# Patient Record
Sex: MALE | Race: WHITE | NOT HISPANIC OR LATINO | URBAN - METROPOLITAN AREA
[De-identification: names, ages, dates, MRNs, and addresses within clinical notes are randomized per-mention and may not be internally consistent; named-entity substitution may affect disease eponyms.]

---

## 2020-07-09 ENCOUNTER — HOSPITAL ENCOUNTER (OUTPATIENT)
Dept: OTHER | Facility: HOSPITAL | Age: 45
Discharge: HOME | End: 2020-07-09
Attending: EMERGENCY MEDICINE

## 2022-06-01 ENCOUNTER — OFFICE VISIT (OUTPATIENT)
Dept: FAMILY MEDICINE CLINIC | Facility: CLINIC | Age: 47
End: 2022-06-01

## 2022-06-01 VITALS
WEIGHT: 186.8 LBS | HEIGHT: 70 IN | BODY MASS INDEX: 26.74 KG/M2 | OXYGEN SATURATION: 97 % | SYSTOLIC BLOOD PRESSURE: 138 MMHG | DIASTOLIC BLOOD PRESSURE: 92 MMHG | HEART RATE: 78 BPM | RESPIRATION RATE: 20 BRPM | TEMPERATURE: 98 F

## 2022-06-01 DIAGNOSIS — Z13.29 SCREENING FOR THYROID DISORDER: Primary | ICD-10-CM

## 2022-06-01 DIAGNOSIS — L98.9 LESION OF SKIN OF SCALP: ICD-10-CM

## 2022-06-01 DIAGNOSIS — R03.0 ELEVATED BLOOD PRESSURE READING: ICD-10-CM

## 2022-06-01 DIAGNOSIS — Z13.220 LIPID SCREENING: ICD-10-CM

## 2022-06-01 DIAGNOSIS — Z12.11 COLON CANCER SCREENING: ICD-10-CM

## 2022-06-01 DIAGNOSIS — Z01.89 ENCOUNTER FOR ROUTINE LABORATORY TESTING: ICD-10-CM

## 2022-06-01 LAB
ALBUMIN SERPL-MCNC: 4.7 G/DL (ref 3.5–5.2)
ALBUMIN/GLOB SERPL: 1.8 G/DL
ALP SERPL-CCNC: 89 U/L (ref 39–117)
ALT SERPL W P-5'-P-CCNC: 36 U/L (ref 1–41)
ANION GAP SERPL CALCULATED.3IONS-SCNC: 10.9 MMOL/L (ref 5–15)
AST SERPL-CCNC: 22 U/L (ref 1–40)
BILIRUB SERPL-MCNC: 0.4 MG/DL (ref 0–1.2)
BUN SERPL-MCNC: 16 MG/DL (ref 6–20)
BUN/CREAT SERPL: 15.4 (ref 7–25)
CALCIUM SPEC-SCNC: 9.6 MG/DL (ref 8.6–10.5)
CHLORIDE SERPL-SCNC: 101 MMOL/L (ref 98–107)
CHOLEST SERPL-MCNC: 226 MG/DL (ref 0–200)
CO2 SERPL-SCNC: 25.1 MMOL/L (ref 22–29)
CREAT SERPL-MCNC: 1.04 MG/DL (ref 0.76–1.27)
DEPRECATED RDW RBC AUTO: 39.4 FL (ref 37–54)
EGFRCR SERPLBLD CKD-EPI 2021: 88 ML/MIN/1.73
ERYTHROCYTE [DISTWIDTH] IN BLOOD BY AUTOMATED COUNT: 12.1 % (ref 12.3–15.4)
GLOBULIN UR ELPH-MCNC: 2.6 GM/DL
GLUCOSE SERPL-MCNC: 91 MG/DL (ref 65–99)
HCT VFR BLD AUTO: 44.4 % (ref 37.5–51)
HDLC SERPL-MCNC: 41 MG/DL (ref 40–60)
HGB BLD-MCNC: 15 G/DL (ref 13–17.7)
LDLC SERPL CALC-MCNC: 104 MG/DL (ref 0–100)
LDLC/HDLC SERPL: 2.18 {RATIO}
MCH RBC QN AUTO: 30.6 PG (ref 26.6–33)
MCHC RBC AUTO-ENTMCNC: 33.8 G/DL (ref 31.5–35.7)
MCV RBC AUTO: 90.6 FL (ref 79–97)
PLATELET # BLD AUTO: 373 10*3/MM3 (ref 140–450)
PMV BLD AUTO: 9.1 FL (ref 6–12)
POTASSIUM SERPL-SCNC: 4.3 MMOL/L (ref 3.5–5.2)
PROT SERPL-MCNC: 7.3 G/DL (ref 6–8.5)
RBC # BLD AUTO: 4.9 10*6/MM3 (ref 4.14–5.8)
SODIUM SERPL-SCNC: 137 MMOL/L (ref 136–145)
TRIGL SERPL-MCNC: 479 MG/DL (ref 0–150)
TSH SERPL DL<=0.05 MIU/L-ACNC: 3.67 UIU/ML (ref 0.27–4.2)
VLDLC SERPL-MCNC: 81 MG/DL (ref 5–40)
WBC NRBC COR # BLD: 7.73 10*3/MM3 (ref 3.4–10.8)

## 2022-06-01 PROCEDURE — 84443 ASSAY THYROID STIM HORMONE: CPT | Performed by: NURSE PRACTITIONER

## 2022-06-01 PROCEDURE — 36415 COLL VENOUS BLD VENIPUNCTURE: CPT | Performed by: NURSE PRACTITIONER

## 2022-06-01 PROCEDURE — 99203 OFFICE O/P NEW LOW 30 MIN: CPT | Performed by: NURSE PRACTITIONER

## 2022-06-01 PROCEDURE — 85027 COMPLETE CBC AUTOMATED: CPT | Performed by: NURSE PRACTITIONER

## 2022-06-01 PROCEDURE — 80061 LIPID PANEL: CPT | Performed by: NURSE PRACTITIONER

## 2022-06-01 PROCEDURE — 80053 COMPREHEN METABOLIC PANEL: CPT | Performed by: NURSE PRACTITIONER

## 2022-06-01 NOTE — PROGRESS NOTES
Chief Complaint  Establish Care (Pt denies any health problems) annual physical    Subjective          Zenon Camejo is a 47 y.o. male who presents to [facility]    History of Present Illness    Annual physical    Has lesion on scalp. Ongoing for 1 year. Wife advised getting larger. Denies any scabbing.     PHQ-2 Total Score: 0   PHQ-9 Total Score: 0        Review of Systems   Constitutional: Negative for chills, fatigue and fever.   Respiratory: Negative for cough and shortness of breath.    Cardiovascular: Negative for chest pain and palpitations.   Gastrointestinal: Negative for constipation, diarrhea, nausea and vomiting.   Musculoskeletal: Negative for back pain and neck pain.   Skin: Negative for rash.        Scalp lesion x2   Neurological: Negative for dizziness and headaches.          Medical History: has no past medical history on file.     Surgical History: has no past surgical history on file.     Family History: family history is not on file.     Social History: reports that he has never smoked. He has never used smokeless tobacco. He reports current alcohol use of about 2.0 standard drinks of alcohol per week.    Allergies: Patient has no known allergies.      Health Maintenance Due   Topic Date Due   • COLORECTAL CANCER SCREENING  Never done   • ANNUAL PHYSICAL  Never done   • TDAP/TD VACCINES (1 - Tdap) Never done   • COVID-19 Vaccine (2 - Booster for Royal series) 07/22/2021          No current outpatient medications on file.      Immunization History   Administered Date(s) Administered   • COVID-19 (ROYAL) 05/27/2021         Objective       Vitals:    06/01/22 1140 06/01/22 1148   BP: (!) 142/102  Comment: Pt stated he drank a [employer] energy drink this morning 138/92   BP Location: Left arm Left arm   Patient Position: Sitting Sitting   Cuff Size: Large Adult Adult   Pulse: 78    Resp: 20    Temp: 98 °F (36.7 °C)    TempSrc: Temporal    SpO2: 97%    Weight:  "84.7 kg (186 lb 12.8 oz)    Height: 177.2 cm (69.75\")       Body mass index is 27 kg/m².   Wt Readings from Last 3 Encounters:   06/01/22 84.7 kg (186 lb 12.8 oz)      BP Readings from Last 3 Encounters:   06/01/22 138/92        Physical Exam  Vitals reviewed.   Constitutional:       Appearance: Normal appearance. He is well-developed.   HENT:      Head: Normocephalic and atraumatic.   Eyes:      Conjunctiva/sclera: Conjunctivae normal.      Pupils: Pupils are equal, round, and reactive to light.   Cardiovascular:      Rate and Rhythm: Normal rate and regular rhythm.      Heart sounds: Normal heart sounds. No murmur heard.  Pulmonary:      Effort: Pulmonary effort is normal.      Breath sounds: Normal breath sounds. No wheezing or rhonchi.   Abdominal:      General: Bowel sounds are normal. There is no distension.      Palpations: Abdomen is soft.      Tenderness: There is no abdominal tenderness.   Skin:     General: Skin is warm and dry.      Comments: Patient has brown macule on left frontal region approximately 4 to 5 mm with irregular borders.    Patient has 2 scalp lesions in the parietal region that have yellow plaque covering them.   Neurological:      Mental Status: He is alert and oriented to person, place, and time.   Psychiatric:         Mood and Affect: Mood and affect normal.         Behavior: Behavior normal.         Thought Content: Thought content normal.         Judgment: Judgment normal.             Result Review :                Assessment and Plan        Diagnoses and all orders for this visit:    1. Screening for thyroid disorder (Primary)  -     TSH    2. Lipid screening  -     Lipid Panel  -     Comprehensive Metabolic Panel    3. Lesion of skin of scalp  -     Ambulatory Referral to Dermatology    4. Encounter for routine laboratory testing  -     CBC (No Diff)    5. Colon cancer screening  -     Cologuard - Stool, Per Rectum; Future    6. Elevated blood pressure reading  Comments:  monitor " blood pressure at home. denies any family hx.           Follow Up     Return for Pending results.     Updated annual wellness visit checklist.  Immunizations discussed.  Screenings discussed.  Recommend yearly dental and eye exams. Also discussed monitoring of blood pressure and lipids.         Patient was given instructions and counseling regarding his condition or for health maintenance advice. Please see specific information pulled into the AVS if appropriate.     AYAN Aburto

## 2022-06-03 DIAGNOSIS — Z13.220 LIPID SCREENING: Primary | ICD-10-CM

## 2022-06-07 ENCOUNTER — PATIENT ROUNDING (BHMG ONLY) (OUTPATIENT)
Dept: FAMILY MEDICINE CLINIC | Facility: CLINIC | Age: 47
End: 2022-06-07

## 2022-06-07 NOTE — PROGRESS NOTES
A [employer] MESSAGE HAS BEEN SENT TO THE PATIENT FOR [place] [facility]

## 2022-06-09 ENCOUNTER — TELEPHONE (OUTPATIENT)
Dept: FAMILY MEDICINE CLINIC | Facility: CLINIC | Age: 47
End: 2022-06-09

## 2022-06-09 DIAGNOSIS — Z12.11 COLON CANCER SCREENING: ICD-10-CM

## 2022-06-09 NOTE — TELEPHONE ENCOUNTER
----- Message from AYAN Aburto sent at 6/9/2022  1:12 PM EDT -----  Negative Cologuard. Repeat in 3 years.

## 2024-01-05 ENCOUNTER — OFFICE VISIT (OUTPATIENT)
Dept: FAMILY MEDICINE CLINIC | Facility: CLINIC | Age: 49
End: 2024-01-05
Payer: OTHER GOVERNMENT

## 2024-01-05 VITALS
BODY MASS INDEX: 28.2 KG/M2 | HEART RATE: 88 BPM | DIASTOLIC BLOOD PRESSURE: 96 MMHG | TEMPERATURE: 98.6 F | WEIGHT: 197 LBS | SYSTOLIC BLOOD PRESSURE: 149 MMHG | OXYGEN SATURATION: 98 % | HEIGHT: 70 IN

## 2024-01-05 DIAGNOSIS — J06.9 VIRAL URI WITH COUGH: Primary | ICD-10-CM

## 2024-01-05 LAB
EXPIRATION DATE: NORMAL
INTERNAL CONTROL: NORMAL
Lab: 8172
S PYO AG THROAT QL: NEGATIVE

## 2024-01-05 PROCEDURE — 99213 OFFICE O/P EST LOW 20 MIN: CPT | Performed by: NURSE PRACTITIONER

## 2024-01-05 PROCEDURE — 87880 STREP A ASSAY W/OPTIC: CPT | Performed by: NURSE PRACTITIONER

## 2024-01-05 RX ORDER — METHYLPREDNISOLONE 4 MG/1
TABLET ORAL
Qty: 21 TABLET | Refills: 0 | Status: SHIPPED | OUTPATIENT
Start: 2024-01-05

## 2024-01-05 NOTE — PROGRESS NOTES
"Chief Complaint  Sore Throat, Cough, and Earache    Subjective      Zenon Camejo is a 48 year old male that presents to [facility] with c/o sore throat, cough and bilateral ear pain. Symptoms started about 4 days ago. He denies fever or chills. He has been using throat spray, lozenges, dayquil, nyquil, mucinex, vitamin c and zinc.   Wife is experiencing similar symptoms.              History of Present Illness    Current Outpatient Medications   Medication Instructions    methylPREDNISolone (MEDROL) 4 MG dose pack Take as directed on package instructions.       The following portions of the patient's history were reviewed and updated as appropriate: allergies, current medications, past family history, past medical history, past social history, past surgical history, and problem list.    Objective   Vital Signs:   /96   Pulse 88   Temp 98.6 °F (37 °C)   Ht 177.2 cm (69.75\")   Wt 89.4 kg (197 lb)   SpO2 98%   BMI 28.47 kg/m²     Wt Readings from Last 3 Encounters:   01/05/24 89.4 kg (197 lb)   06/01/22 84.7 kg (186 lb 12.8 oz)     BP Readings from Last 3 Encounters:   01/05/24 149/96   06/01/22 138/92     Physical Exam  Vitals reviewed.   Constitutional:       Appearance: Normal appearance. He is well-developed. He is not ill-appearing.   HENT:      Head: Normocephalic and atraumatic.      Right Ear: Tympanic membrane, ear canal and external ear normal.      Left Ear: Tympanic membrane, ear canal and external ear normal.      Nose: Nose normal.      Mouth/Throat:      Mouth: Mucous membranes are moist.      Pharynx: Posterior oropharyngeal erythema present. No oropharyngeal exudate.   Eyes:      Conjunctiva/sclera: Conjunctivae normal.      Pupils: Pupils are equal, round, and reactive to light.   Cardiovascular:      Rate and Rhythm: Normal rate and regular rhythm.      Heart sounds:      No friction rub.   Pulmonary:      Effort: Pulmonary effort is normal.      " Breath sounds: Normal breath sounds.   Skin:     General: Skin is warm and dry.   Neurological:      Mental Status: He is alert and oriented to person, place, and time.   Psychiatric:         Mood and Affect: Mood and affect normal.         Behavior: Behavior normal.          Result Review :  The following data was reviewed by: AYAN Nieto on 01/05/2024:          Lab Results (last 72 hours)       Procedure Component Value Units Date/Time    POCT rapid strep A [[id number]] Collected: 01/05/24 0809    Specimen: Swab Updated: 01/05/24 0810     Rapid Strep A Screen Negative     Internal Control Passed     Lot Number [id number]     Expiration Date 07-             No Images in the past 120 days found..    Lab Results   Component Value Date    RAPSCRN Negative 01/05/2024       Procedures        Assessment and Plan   Diagnoses and all orders for this visit:    1. Viral URI with cough (Primary)  -     POCT rapid strep A  -     methylPREDNISolone (MEDROL) 4 MG dose pack; Take as directed on package instructions.  Dispense: 21 tablet; Refill: 0                 There are no discontinued medications.       Follow Up   No follow-ups on file.  Patient was given instructions and counseling regarding his condition or for health maintenance advice. Please see specific information pulled into the AVS if appropriate.     Supportive care with rest, plenty of fluids, tylenol/ibuprofen for pain and/or fever as needed per label instructions.  You may find a cool-mist humidifier helpful as well as throat lozenges, Chloraseptic spray and warm salt water gargles.  Should you develop shortness of breath, chest pain or worsening of symptoms please go to the ER.      AYAN Nieto  01/05/24  08:25 EST

## 2024-01-05 NOTE — PATIENT INSTRUCTIONS
Supportive care with rest, plenty of fluids, tylenol/ibuprofen for pain and/or fever as needed per label instructions.  You may find a cool-mist humidifier helpful as well as throat lozenges, Chloraseptic spray and warm salt water gargles.  Should you develop shortness of breath, chest pain or worsening of symptoms please go to the ER.